# Patient Record
(demographics unavailable — no encounter records)

---

## 2017-12-08 NOTE — RADIOLOGY REPORT (SQ)
EXAM DESCRIPTION:  CHEST PA/LAT



COMPLETED DATE/TIME:  12/8/2017 8:27 pm



REASON FOR STUDY:  CP



COMPARISON:  4/29/2016



EXAM PARAMETERS:  NUMBER OF VIEWS: two views

TECHNIQUE: Digital Frontal and Lateral radiographic views of the chest acquired.

RADIATION DOSE: NA

LIMITATIONS: none



FINDINGS:  LUNGS AND PLEURA: No opacities, masses or pneumothorax. No pleural effusion.

MEDIASTINUM AND HILAR STRUCTURES: No masses or contour abnormalities.

HEART AND VASCULAR STRUCTURES: Heart normal size.  No evidence for failure.

BONES: No acute findings.

HARDWARE: None in the chest.

OTHER: No other significant finding.



IMPRESSION:  NO SIGNIFICANT RADIOGRAPHIC FINDING IN THE CHEST.



TECHNICAL DOCUMENTATION:  JOB ID:  [id number]

 2011 [employer]- All Rights Reserved

## 2017-12-08 NOTE — ER DOCUMENT REPORT
ED General





- General


Chief Complaint: Chest Pain


Stated Complaint: CHEST PAIN


Time Seen by Provider: 12/08/17 19:43


TRAVEL OUTSIDE OF THE [place] IN LAST 30 DAYS: No





- HPI


Patient complains to provider of: Intermittent chest pain intermittent dizziness


Notes: 





Patient coming in today complains intermittent chest pain intermittent 

dizziness.  Patient states more concerned about dizziness.  Patient states is 

been ongoing for many months.  States symptoms are worse starting from earlier 

this morning.  Patient states he thinks his dizziness is intermittent chest 

pains prior related to anxiety states also having a lot of stress at work.  

Upon my evaluation patient resting comfortably watching TV states that his 

symptoms basically resolved.  Patient states chest pain throughout the whole 

chest and shortness of breath or any other associated etiologies.  Patient 

states that his dizziness sometimes associated with ringing in his ear.  

Patient did have a stress test in April 2016 that was negative.  Patient states 

since that time has not followed up with cardiology states he does have high 

blood pressure compliant with medications patient states he knows that he is 

obese recently joined a gym and has started working out.  Denies fevers chills 

nausea vomiting abdominal pain.  Denies any recent trauma or travel.





- Related Data


Allergies/Adverse Reactions: 


 





No Known Allergies Allergy (Verified 12/08/17 18:23)


 











Past Medical History





- Social History


Smoking Status: Never Smoker


Frequency of alcohol use: None


Family History: Reviewed & Not Pertinent


Patient has suicidal ideation: No


Patient has homicidal ideation: No





- Past Medical History


Cardiac Medical History: Reports: Hx Hypertension


Renal/ Medical History: Denies: Hx Peritoneal Dialysis





Review of Systems





- Review of Systems


Constitutional: No symptoms reported


EENT: No symptoms reported


Cardiovascular: Chest pain, Dizziness


Respiratory: No symptoms reported


Gastrointestinal: No symptoms reported


Genitourinary: No symptoms reported


Male Genitourinary: No symptoms reported


Musculoskeletal: No symptoms reported


Skin: No symptoms reported


Hematologic/Lymphatic: No symptoms reported


Neurological/Psychological: No symptoms reported


-: Yes All other systems reviewed and negative





Physical Exam





- Vital signs


Vitals: 


 











Temp Pulse Resp BP Pulse Ox


 


 99.0 F   83   18   154/79 H  97 


 


 12/08/17 18:38  12/08/17 18:38  12/08/17 18:38  12/08/17 18:38  12/08/17 18:38











Interpretation: Normal





- General


General appearance: Appears well, Alert





- HEENT


Head: Normocephalic, Atraumatic


Eyes: Normal


Conjunctiva: Normal


Cornea: Normal


Extraocular movements intact: Yes


Eyelashes: Normal


Pupils: PERRL


Anterior chamber: Normal


Fundascopic: Normal


Ears: Normal


External canal: Normal


Tympanic membrane: Normal


Pharynx: Normal


Neck: Normal





- Respiratory


Respiratory status: No respiratory distress


Chest status: Nontender


Breath sounds: Normal


Chest palpation: Normal





- Cardiovascular


Rhythm: Regular


Heart sounds: Normal auscultation


Murmur: No





- Abdominal


Inspection: Normal


Distension: No distension


Bowel sounds: Normal


Tenderness: Nontender


Organomegaly: No organomegaly





- Back


Back: Normal, Nontender





- Extremities


General upper extremity: Normal inspection, Nontender, Normal color, Normal ROM

, Normal temperature


General lower extremity: Normal inspection, Nontender, Normal color, Normal ROM

, Normal temperature, Normal weight bearing.  No: Richelle's sign





- Neurological


Neuro grossly intact: Yes


Cognition: Normal


Orientation: AAOx4


[place] Coma Scale Eye Opening: Spontaneous


[place] Coma Scale Verbal: Oriented


[place] Coma Scale Motor: Obeys Commands


[place] Coma Scale Total: 15


Speech: Normal


Motor strength normal: LUE, RUE, LLE, RLE


Sensory: Normal





- Psychological


Associated symptoms: Normal affect, Normal mood





- Skin


Skin Temperature: Warm


Skin Moisture: Dry


Skin Color: Normal





Course





- Re-evaluation


Re-evalutation: 





12/08/17 23:06


The patient has atypical chest pain as the patient's chest pain is not 

suggestive of pulmonary embolus, cardiac ischemia, aortic dissection, or other 

serious etiology. Given the extremely low risk of these diagnoses further 

testing and evaluation for these possibilities does not appear to be indicated 

at this time. The patient has been instructed to return if the symptoms worsen 

or change in any way.


Unclear etiology for underlying dizziness.  Ringing here could be underlying 

inflammation of the anterior.  We will start the patient on Antivert encouraged 

patient follow-up with PCP also.  Patient to follow-up cardiologist provided.





- Vital Signs


Vital signs: 


 











Temp Pulse Resp BP Pulse Ox


 


 99.0 F   83   19   154/79 H  97 


 


 12/08/17 18:38  12/08/17 18:38  12/08/17 22:21  12/08/17 18:38  12/08/17 22:21














- Laboratory


Result Diagrams: 


 12/08/17 22:23





 12/08/17 20:00


Laboratory results interpreted by me: 


 











  12/08/17 12/08/17





  20:00 22:23


 


Hgb   12.9 L


 


MCV   78 L


 


MCH   25.8 L


 


RDW   14.9 H


 


Glucose  127 H 














Discharge





- Discharge


Clinical Impression: 


 Chest pain of uncertain etiology, Dizziness





Condition: Good


Disposition: HOME, SELF-CARE


Instructions:  Chest Pain of Unclear Cause ([facility]), Dizziness ([facility]), Meclizine (

[facility])


Additional Instructions: 


Your physical examination tonight does not reveal any critical pathology.  

Cannot explain your dizziness or this intermittent chest pain.  Your most 

recent stress test is negative.  Your lab work and EKG here are negative as 

well.  We will start you on Antivert to see if this would help out with her 

dizziness I would recommend following up with your primary care physician in 2-

3 weeks.  Keep a record of these episodes and the events occurring around you 

when this happens.  Also upon Your last visit and admission here to the 

hospital cardiologist did recommend to establish yourself with a cardiologist I 

will give you Dr. Arzate's information.  Return to the ER if any symptoms worsen.


Prescriptions: 


Meclizine HCl [Antivert 25 mg Tablet] 25 mg PO TID PRN #21 tablet


 PRN Reason: 


Forms:  Return to Work


Referrals: 


RUSTY DE DIOS MD [ACTIVE STAFF] - Follow up as needed

## 2017-12-08 NOTE — ER DOCUMENT REPORT
ED Medical Screen (RME)





- General


Chief Complaint: Chest Pain


Stated Complaint: CHEST PAIN


Time Seen by Provider: 12/08/17 19:43


Notes: 





42-year-old male here with complaints of midsternal chest pain nonradiating 

that has been ongoing episodically over the past 6 months with associated 

lightheadedness and diaphoresis.  He is here today because it started 

approximately 12 hours ago while he was sitting down and it has been constant 

since then.  He reports that these episodes usually do not last as long as it 

has today and this is why he is here.  He denies shortness of breath.  He has 

not followed up with his primary care physician regarding these symptoms.





EXAM


Clear to auscultation bilaterally


Regular rate and rhythm





TRAVEL OUTSIDE OF THE [place] IN LAST 30 DAYS: No





- Related Data


Allergies/Adverse Reactions: 


 





No Known Allergies Allergy (Verified 12/08/17 18:23)


 











Past Medical History





- Social History


Frequency of alcohol use: None





- Past Medical History


Cardiac Medical History: Reports: Hx Hypertension


Renal/ Medical History: Denies: Hx Peritoneal Dialysis





Physical Exam





- Vital signs


Vitals: 





 











Temp Pulse Resp BP Pulse Ox


 


 99.0 F   83   18   154/79 H  97 


 


 12/08/17 18:38  12/08/17 18:38  12/08/17 18:38  12/08/17 18:38  12/08/17 18:38














Course





- Vital Signs


Vital signs: 





 











Temp Pulse Resp BP Pulse Ox


 


 99.0 F   83   18   154/79 H  97 


 


 12/08/17 18:38  12/08/17 18:38  12/08/17 18:38  12/08/17 18:38  12/08/17 18:38